# Patient Record
Sex: MALE | Race: WHITE | NOT HISPANIC OR LATINO | URBAN - METROPOLITAN AREA
[De-identification: names, ages, dates, MRNs, and addresses within clinical notes are randomized per-mention and may not be internally consistent; named-entity substitution may affect disease eponyms.]

---

## 2018-08-17 ENCOUNTER — OFFICE VISIT (OUTPATIENT)
Dept: FAMILY MEDICINE CLINIC | Facility: CLINIC | Age: 63
End: 2018-08-17

## 2018-08-17 VITALS
OXYGEN SATURATION: 97 % | TEMPERATURE: 98.9 F | BODY MASS INDEX: 30.07 KG/M2 | HEART RATE: 82 BPM | HEIGHT: 72 IN | WEIGHT: 222 LBS | SYSTOLIC BLOOD PRESSURE: 126 MMHG | DIASTOLIC BLOOD PRESSURE: 80 MMHG

## 2018-08-17 DIAGNOSIS — E78.00 PURE HYPERCHOLESTEROLEMIA: Primary | ICD-10-CM

## 2018-08-17 PROBLEM — E78.5 HLD (HYPERLIPIDEMIA): Status: ACTIVE | Noted: 2018-08-17

## 2018-08-17 PROBLEM — R19.5 HEME POSITIVE STOOL: Status: RESOLVED | Noted: 2018-08-17 | Resolved: 2018-08-17

## 2018-08-17 PROBLEM — R19.5 HEME POSITIVE STOOL: Status: ACTIVE | Noted: 2018-08-17

## 2018-08-17 PROCEDURE — 99203 OFFICE O/P NEW LOW 30 MIN: CPT | Performed by: INTERNAL MEDICINE

## 2018-08-17 NOTE — PROGRESS NOTES
Subjective chief complaint is general checkup  Rodriguez Sosa is a 63 y.o. male.     History of Present Illness   Bill is here today for checkup.  His been 4 years since I've seen the patient.  At his last visit he did have some hyperlipidemia.  He was not interested in taking cholesterol medicine.  We also did find a heme positive stool.  We did refer him to a gastroenterologist.  He has never had a colonoscopy.  He reports that several repeat stool studies were negative.  He did have some recent lab work drawn at an outside facility.  His blood count is normal and he certainly does not appear to be anemic.  His blood sugar and kidney and liver labs looked okay.  His cholesterol total was 227.  His HDL was okay at 72 his LDL was slightly high at 141.  He had a hemoglobin A1c that was 5.2 and a TSH was 1.6.  His total testosterone was normal at 591.  His free was slightly low at 4  There is no strong family history of colon cancer.  The following portions of the patient's history were reviewed and updated as appropriate: allergies, current medications, past family history, past medical history, past social history, past surgical history and problem list.    Review of Systems   Respiratory: Negative for chest tightness and shortness of breath.    Cardiovascular: Negative for chest pain and leg swelling.   Gastrointestinal: Negative for abdominal pain and blood in stool.   Neurological: Negative for dizziness, light-headedness and headache.       Objective   Physical Exam   Constitutional: He is oriented to person, place, and time. He appears well-developed and well-nourished.   Eyes: Conjunctivae are normal. No scleral icterus.   Neck: No JVD present. Carotid bruit is not present. No thyromegaly present.   Cardiovascular: Normal rate, regular rhythm, normal heart sounds and intact distal pulses.  Exam reveals no gallop and no friction rub.    No murmur heard.  Pulmonary/Chest: Effort normal and breath sounds normal.  No respiratory distress. He has no wheezes. He has no rales.   Abdominal: Soft. Bowel sounds are normal. He exhibits no distension and no mass. There is no tenderness. There is no guarding.   Musculoskeletal: He exhibits no edema.   Neurological: He is alert and oriented to person, place, and time.   Skin: Skin is warm and dry.   Psychiatric: He has a normal mood and affect.   Nursing note and vitals reviewed.        Assessment/Plan   Rodriguez was seen today for follow-up.    Diagnoses and all orders for this visit:    Pure hypercholesterolemia    Bill is here today for a checkup.  He seems to be doing well.  We did discuss screening for colon cancer.  I did advise that he look into whether his insurance will cover a cold guard test.  He is going to check into that prior to bringing me back paperwork to sign.  If all is well we will see him back next year.

## 2023-12-07 NOTE — PROGRESS NOTES
"Subjective   History of Present Illness: Rodriguez Sosa is a 68 y.o. male is being seen for consultation today at the request of CINTHIA Blum for back pain. Today patient reports left sided lower back pain into his left leg.  The pain began about 6 weeks ago when the patient was lifting heavy bags.  The pain was quite severe to begin with.  The pain would radiate from his low back into his left lower extremity and down to the foot.  This is associated with some numbness and tingling.  Over the course the time the pain has improved, although the patient still feels that he is quite limited in activity.  He has been doing physical therapy.  He has not tried any injections.    Chief Complaint   Patient presents with    Back Pain     New evaluation          Previous treatment: Physical therapy    Previous neurosurgery:      Previous injections:     The following portions of the patient's history were reviewed and updated as appropriate: allergies, current medications, past family history, past medical history, past social history, past surgical history, and problem list.    Review of Systems   Constitutional:  Positive for activity change.   HENT: Negative.     Eyes: Negative.    Respiratory: Negative.     Cardiovascular: Negative.    Gastrointestinal: Negative.    Endocrine: Negative.    Genitourinary: Negative.    Musculoskeletal:  Positive for arthralgias, back pain and myalgias.   Skin: Negative.    Allergic/Immunologic: Negative.    Neurological:  Positive for weakness (left leg) and numbness (tingling/left leg).   Hematological: Negative.    Psychiatric/Behavioral:  Positive for sleep disturbance.        Objective      /73   Pulse 72   Ht 182.9 cm (72\")   Wt 93.4 kg (206 lb)   BMI 27.94 kg/m²    Body mass index is 27.94 kg/m².  Vitals:    12/11/23 1248   PainSc:   2           Neurologic Exam     Mental Status   Oriented to person, place, and time.     Motor Exam     Strength   Strength 5/5 " throughout.     Sensory Exam   Light touch normal.       Assessment & Plan   Independent Review of Radiographic Studies:      I personally reviewed and interpreted the images from the following studies.    MRI lumbar spine: There is a disc extrusion at L5-S1 paracentral to the left extending down to the level of the S1 pedicle causing compression of the S1 nerve root    Medical Decision Making:      Rodriguez Sosa is a 68 y.o. male with about a 6-week history of lumbar radiculopathy with disc herniation at L5-S1.  Overall he is seeing improvement over the course of time.  He can continue with physical therapy.  He should continue to limit heavy lifting bending and twisting.  Will set him up for lumbar VINH at L5-S1.  I will see him back should his symptoms not improve or worsen over the course of time      Diagnoses and all orders for this visit:    1. Lumbar radiculopathy (Primary)    2. Lumbar disc herniation      No follow-ups on file.    This patient was examined wearing appropriate personal protective equipment.                      Dr. Martin Christy IV    12/11/23  13:15 EST

## 2023-12-11 ENCOUNTER — OFFICE VISIT (OUTPATIENT)
Dept: NEUROSURGERY | Facility: CLINIC | Age: 68
End: 2023-12-11
Payer: COMMERCIAL

## 2023-12-11 VITALS
HEART RATE: 72 BPM | SYSTOLIC BLOOD PRESSURE: 123 MMHG | HEIGHT: 72 IN | WEIGHT: 206 LBS | DIASTOLIC BLOOD PRESSURE: 73 MMHG | BODY MASS INDEX: 27.9 KG/M2

## 2023-12-11 DIAGNOSIS — M54.16 LUMBAR RADICULOPATHY: Primary | ICD-10-CM

## 2023-12-11 DIAGNOSIS — M51.26 LUMBAR DISC HERNIATION: ICD-10-CM

## 2023-12-21 ENCOUNTER — APPOINTMENT (OUTPATIENT)
Dept: OTHER | Facility: HOSPITAL | Age: 68
End: 2023-12-21
Payer: COMMERCIAL

## 2023-12-21 ENCOUNTER — HOSPITAL ENCOUNTER (OUTPATIENT)
Dept: PAIN MEDICINE | Facility: HOSPITAL | Age: 68
Discharge: HOME | End: 2023-12-21
Payer: COMMERCIAL

## 2023-12-21 VITALS
OXYGEN SATURATION: 95 % | HEIGHT: 72 IN | DIASTOLIC BLOOD PRESSURE: 77 MMHG | BODY MASS INDEX: 27.9 KG/M2 | RESPIRATION RATE: 12 BRPM | WEIGHT: 206 LBS | TEMPERATURE: 97.1 F | HEART RATE: 64 BPM | SYSTOLIC BLOOD PRESSURE: 127 MMHG

## 2023-12-21 DIAGNOSIS — R52 PAIN: ICD-10-CM

## 2023-12-21 DIAGNOSIS — M54.42 CHRONIC MIDLINE LOW BACK PAIN WITH LEFT-SIDED SCIATICA: Primary | ICD-10-CM

## 2023-12-21 DIAGNOSIS — M54.16 LUMBAR RADICULOPATHY: ICD-10-CM

## 2023-12-21 DIAGNOSIS — G89.29 CHRONIC MIDLINE LOW BACK PAIN WITH LEFT-SIDED SCIATICA: Primary | ICD-10-CM

## 2023-12-21 PROCEDURE — 77003 FLUOROGUIDE FOR SPINE INJECT: CPT

## 2023-12-21 PROCEDURE — 25510000001 IOPAMIDOL 41 % SOLUTION: Performed by: PHYSICAL MEDICINE & REHABILITATION

## 2023-12-21 PROCEDURE — 25010000002 METHYLPREDNISOLONE PER 40 MG: Performed by: PHYSICAL MEDICINE & REHABILITATION

## 2023-12-21 RX ORDER — IOPAMIDOL 408 MG/ML
10 INJECTION, SOLUTION INTRATHECAL
Status: COMPLETED | OUTPATIENT
Start: 2023-12-21 | End: 2023-12-21

## 2023-12-21 RX ORDER — METHYLPREDNISOLONE ACETATE 40 MG/ML
40 INJECTION, SUSPENSION INTRA-ARTICULAR; INTRALESIONAL; INTRAMUSCULAR; SOFT TISSUE ONCE
Status: COMPLETED | OUTPATIENT
Start: 2023-12-21 | End: 2023-12-21

## 2023-12-21 RX ADMIN — METHYLPREDNISOLONE ACETATE 40 MG: 40 INJECTION, SUSPENSION INTRA-ARTICULAR; INTRALESIONAL; INTRAMUSCULAR; INTRASYNOVIAL; SOFT TISSUE at 14:50

## 2023-12-21 RX ADMIN — IOPAMIDOL 1 ML: 408 INJECTION, SOLUTION INTRATHECAL at 14:50

## 2023-12-21 NOTE — PROCEDURES
Procedures    LBP to LLE, seen by Dr. Christy, MRI L-spine with L5/S1 left-sided extrusion impinging on left S1, referred for L5-S1 VINH. Denies allergy to iodine or contrast, anticoagulation, current infection or ABX.    Perform left L5/S1 ILESI.  No change to meds today.  F/u with Dr. Christy.       Lumbar Epidural Steroid Injection    PREOPERATIVE DIAGNOSIS: Lumbar radiculopathy    POSTOPERATIVE DIAGNOSIS: Lumbar radiculopathy    PROCEDURE PERFORMED: Lumbar Epidural Steroid Injection    The patient presents with a history of  [ lumbar ] degenerative disc disease with  radiculitis. The patient presents today for a [ lumbar ]  epidural steroid injection at level left [  L5-S1 ]. This is the [ first ] procedure. The patient understands the risks and benefits of the procedure and wishes to proceed.  The patient was seen in the preoperative area.  Patient's consent was obtained and updated.  Vitals were taken.  Patient was then brought to the procedure suite and placed in a prone position. The appropriate anatomic area was widely prepped with Chloraprep and draped in a sterile fashion. Under fluoroscopic guidance using [ caudal tilt AP ] view a 20 gauge styleted tuohy needle was passed through skin anesthetized with 1% Lidocaine without epinephrine.  The needle was advanced using the continuous loss of resistance into the epidural space at 0.8cm from the needle hub. Needle tip placement in the epidural space was confirmed by loss of resistance and injection of [ 2 ] mL of preservative free contrast.  Following this [ 4 ] mL of a solution containing [ Depomedrol 80mg and saline 3ml ] was carefully administered in the epidural space. A sterile dressing was placed over the puncture site.    The patient tolerated the procedure with [ no complications ]. They were then brought to the post procedure area where they recovered nicely.

## 2023-12-21 NOTE — DISCHARGE INSTRUCTIONS

## 2024-01-11 ENCOUNTER — PATIENT ROUNDING (BHMG ONLY) (OUTPATIENT)
Dept: PAIN MEDICINE | Facility: CLINIC | Age: 69
End: 2024-01-11
Payer: COMMERCIAL

## 2024-01-11 ENCOUNTER — OFFICE VISIT (OUTPATIENT)
Dept: PAIN MEDICINE | Facility: CLINIC | Age: 69
End: 2024-01-11
Payer: COMMERCIAL

## 2024-01-11 VITALS
DIASTOLIC BLOOD PRESSURE: 78 MMHG | BODY MASS INDEX: 27.22 KG/M2 | HEART RATE: 72 BPM | SYSTOLIC BLOOD PRESSURE: 122 MMHG | OXYGEN SATURATION: 96 % | RESPIRATION RATE: 16 BRPM | WEIGHT: 200.7 LBS

## 2024-01-11 DIAGNOSIS — M51.36 DDD (DEGENERATIVE DISC DISEASE), LUMBAR: ICD-10-CM

## 2024-01-11 DIAGNOSIS — G89.29 CHRONIC MIDLINE LOW BACK PAIN WITH LEFT-SIDED SCIATICA: Primary | ICD-10-CM

## 2024-01-11 DIAGNOSIS — M54.16 LUMBAR RADICULOPATHY: ICD-10-CM

## 2024-01-11 DIAGNOSIS — M54.42 CHRONIC MIDLINE LOW BACK PAIN WITH LEFT-SIDED SCIATICA: Primary | ICD-10-CM

## 2024-01-11 NOTE — PROGRESS NOTES
Subjective   Rodriguez Sosa is a 68 y.o. male.     History of Present Illness  Chronic LBP to LLE, aching, tingling, with weakness, interferes with activity, has completed PT. MRI L-spine with DDD and compression of left S1 nerve root in lateral recess. Seen by Dr. Christy, referred for L5/S1 ILESI with near-complete resolution of pain, still with weakness and loss of coordination. Denies allergy to iodine or contrast. No FH of substance abuse.   Back Pain  Associated symptoms include numbness and weakness. Pertinent negatives include no abdominal pain, bladder incontinence, chest pain or fever.        The following portions of the patient's history were reviewed and updated as appropriate: allergies, current medications, past family history, past medical history, past social history, past surgical history, and problem list.    Review of Systems   Constitutional:  Negative for chills, fatigue and fever.   HENT:  Negative for hearing loss and trouble swallowing.    Eyes:  Negative for visual disturbance.   Respiratory:  Negative for shortness of breath.    Cardiovascular:  Negative for chest pain.   Gastrointestinal:  Negative for abdominal pain, constipation, diarrhea, nausea and vomiting.   Genitourinary:  Negative for urinary incontinence.   Musculoskeletal:  Positive for back pain. Negative for arthralgias, joint swelling, myalgias and neck pain.   Neurological:  Positive for weakness and numbness. Negative for dizziness and headache.       Objective   Physical Exam  Constitutional:       Appearance: Normal appearance. He is well-developed.   HENT:      Head: Normocephalic and atraumatic.   Eyes:      Pupils: Pupils are equal, round, and reactive to light.   Cardiovascular:      Rate and Rhythm: Normal rate and regular rhythm.      Heart sounds: Normal heart sounds.   Pulmonary:      Effort: Pulmonary effort is normal.      Breath sounds: Normal breath sounds.   Abdominal:      General: Bowel sounds are normal.  There is no distension.      Palpations: Abdomen is soft.      Tenderness: There is no abdominal tenderness.   Musculoskeletal:      Cervical back: Normal range of motion.   Neurological:      Mental Status: He is alert and oriented to person, place, and time.      Sensory: Sensory deficit present.      Motor: Weakness present.      Deep Tendon Reflexes: Reflexes are normal and symmetric. Reflexes normal.   Psychiatric:         Mood and Affect: Mood normal.         Behavior: Behavior normal.         Thought Content: Thought content normal.         Judgment: Judgment normal.           Assessment & Plan   Problems Addressed this Visit          Other    Chronic midline low back pain with left-sided sciatica - Primary    Lumbar radiculopathy    DDD (degenerative disc disease), lumbar     Diagnoses         Codes Comments    Chronic midline low back pain with left-sided sciatica    -  Primary ICD-10-CM: M54.42, G89.29  ICD-9-CM: 724.2, 724.3, 338.29     Lumbar radiculopathy     ICD-10-CM: M54.16  ICD-9-CM: 724.4     DDD (degenerative disc disease), lumbar     ICD-10-CM: M51.36  ICD-9-CM: 722.52             Improvement with left L5/S1 IILESI. Schedule left L5 & S1 TFESI to see if we can get more improvement in weakness and numbness.  No change in meds today.  RTC for TFESI then in 2 months for f/u.

## 2024-01-11 NOTE — PROGRESS NOTES
January 11, 2024    Hello, may I speak with Rodriguez Sosa?    My name is Gloria      I am  with [facility]  [address]  [place] [address].    Before we get started may I verify your date of birth? 1955    I am calling to officially welcome you to our practice and ask about your recent visit. Is this a good time to talk? yes    Tell me about your visit with us. What things went well?  Everything went well        We're always looking for ways to make our patients' experiences even better. Do you have recommendations on ways we may improve?  no    Overall were you satisfied with your first visit to our practice? yes       I appreciate you taking the time to speak with me today. Is there anything else I can do for you? no      Thank you, and have a great day.

## 2024-01-23 ENCOUNTER — HOSPITAL ENCOUNTER (OUTPATIENT)
Dept: PAIN MEDICINE | Facility: HOSPITAL | Age: 69
Discharge: HOME | End: 2024-01-23
Payer: COMMERCIAL

## 2024-01-23 VITALS
SYSTOLIC BLOOD PRESSURE: 128 MMHG | BODY MASS INDEX: 27.09 KG/M2 | HEIGHT: 72 IN | HEART RATE: 72 BPM | RESPIRATION RATE: 16 BRPM | WEIGHT: 200 LBS | TEMPERATURE: 97.1 F | DIASTOLIC BLOOD PRESSURE: 80 MMHG | OXYGEN SATURATION: 96 %

## 2024-01-23 DIAGNOSIS — R52 PAIN: ICD-10-CM

## 2024-01-23 DIAGNOSIS — M54.42 CHRONIC MIDLINE LOW BACK PAIN WITH LEFT-SIDED SCIATICA: Primary | ICD-10-CM

## 2024-01-23 DIAGNOSIS — M54.16 LUMBAR RADICULOPATHY: ICD-10-CM

## 2024-01-23 DIAGNOSIS — G89.29 CHRONIC MIDLINE LOW BACK PAIN WITH LEFT-SIDED SCIATICA: Primary | ICD-10-CM

## 2024-01-23 RX ORDER — DEXAMETHASONE SODIUM PHOSPHATE 10 MG/ML
10 INJECTION, SOLUTION INTRAMUSCULAR; INTRAVENOUS ONCE
Status: DISCONTINUED | OUTPATIENT
Start: 2024-01-23 | End: 2024-01-24 | Stop reason: HOSPADM

## 2024-01-23 RX ORDER — LIDOCAINE HYDROCHLORIDE 10 MG/ML
5 INJECTION, SOLUTION EPIDURAL; INFILTRATION; INTRACAUDAL; PERINEURAL ONCE
Status: DISCONTINUED | OUTPATIENT
Start: 2024-01-23 | End: 2024-01-24 | Stop reason: HOSPADM

## 2024-01-23 RX ORDER — IOPAMIDOL 408 MG/ML
10 INJECTION, SOLUTION INTRATHECAL
Status: DISCONTINUED | OUTPATIENT
Start: 2024-01-23 | End: 2024-01-24 | Stop reason: HOSPADM